# Patient Record
Sex: FEMALE | Race: WHITE | NOT HISPANIC OR LATINO | Employment: OTHER | URBAN - METROPOLITAN AREA
[De-identification: names, ages, dates, MRNs, and addresses within clinical notes are randomized per-mention and may not be internally consistent; named-entity substitution may affect disease eponyms.]

---

## 2023-12-14 ENCOUNTER — TELEPHONE (OUTPATIENT)
Dept: PRIMARY CARE | Facility: CLINIC | Age: 78
End: 2023-12-14
Payer: MEDICARE

## 2023-12-14 DIAGNOSIS — E78.2 MIXED HYPERLIPIDEMIA: ICD-10-CM

## 2023-12-14 DIAGNOSIS — D72.819 LEUKOPENIA, UNSPECIFIED TYPE: ICD-10-CM

## 2023-12-14 DIAGNOSIS — E03.9 HYPOTHYROIDISM, UNSPECIFIED TYPE: ICD-10-CM

## 2023-12-18 PROBLEM — E78.2 MIXED HYPERLIPIDEMIA: Status: ACTIVE | Noted: 2018-10-02

## 2023-12-18 PROBLEM — E03.9 HYPOTHYROIDISM, UNSPECIFIED: Status: ACTIVE | Noted: 2018-10-02

## 2023-12-18 PROBLEM — D72.819 DECREASED WHITE BLOOD CELL COUNT, UNSPECIFIED: Status: ACTIVE | Noted: 2018-10-02

## 2024-01-10 ENCOUNTER — LAB (OUTPATIENT)
Dept: LAB | Facility: LAB | Age: 79
End: 2024-01-10
Payer: MEDICARE

## 2024-01-10 DIAGNOSIS — E03.9 HYPOTHYROIDISM, UNSPECIFIED TYPE: ICD-10-CM

## 2024-01-10 DIAGNOSIS — D72.819 LEUKOPENIA, UNSPECIFIED TYPE: ICD-10-CM

## 2024-01-10 DIAGNOSIS — E78.2 MIXED HYPERLIPIDEMIA: ICD-10-CM

## 2024-01-10 LAB
ALBUMIN SERPL BCP-MCNC: 4.2 G/DL (ref 3.4–5)
ALP SERPL-CCNC: 66 U/L (ref 33–136)
ALT SERPL W P-5'-P-CCNC: 12 U/L (ref 7–45)
ANION GAP SERPL CALC-SCNC: 12 MMOL/L (ref 10–20)
APPEARANCE UR: CLEAR
AST SERPL W P-5'-P-CCNC: 19 U/L (ref 9–39)
BASOPHILS # BLD AUTO: 0.04 X10*3/UL (ref 0–0.1)
BASOPHILS NFR BLD AUTO: 1.1 %
BILIRUB SERPL-MCNC: 0.6 MG/DL (ref 0–1.2)
BILIRUB UR STRIP.AUTO-MCNC: NEGATIVE MG/DL
BUN SERPL-MCNC: 17 MG/DL (ref 6–23)
CALCIUM SERPL-MCNC: 9.4 MG/DL (ref 8.6–10.3)
CHLORIDE SERPL-SCNC: 99 MMOL/L (ref 98–107)
CHOLEST SERPL-MCNC: 215 MG/DL (ref 0–199)
CHOLESTEROL/HDL RATIO: 2.9
CO2 SERPL-SCNC: 27 MMOL/L (ref 21–32)
COLOR UR: NORMAL
CREAT SERPL-MCNC: 1.05 MG/DL (ref 0.5–1.05)
EGFRCR SERPLBLD CKD-EPI 2021: 54 ML/MIN/1.73M*2
EOSINOPHIL # BLD AUTO: 0.07 X10*3/UL (ref 0–0.4)
EOSINOPHIL NFR BLD AUTO: 2 %
ERYTHROCYTE [DISTWIDTH] IN BLOOD BY AUTOMATED COUNT: 12 % (ref 11.5–14.5)
GLUCOSE SERPL-MCNC: 78 MG/DL (ref 74–99)
GLUCOSE UR STRIP.AUTO-MCNC: NEGATIVE MG/DL
HCT VFR BLD AUTO: 40.4 % (ref 36–46)
HDLC SERPL-MCNC: 73.1 MG/DL
HGB BLD-MCNC: 13.3 G/DL (ref 12–16)
IMM GRANULOCYTES # BLD AUTO: 0.01 X10*3/UL (ref 0–0.5)
IMM GRANULOCYTES NFR BLD AUTO: 0.3 % (ref 0–0.9)
KETONES UR STRIP.AUTO-MCNC: NEGATIVE MG/DL
LDLC SERPL CALC-MCNC: 125 MG/DL
LEUKOCYTE ESTERASE UR QL STRIP.AUTO: NEGATIVE
LYMPHOCYTES # BLD AUTO: 1.3 X10*3/UL (ref 0.8–3)
LYMPHOCYTES NFR BLD AUTO: 36.9 %
MCH RBC QN AUTO: 29.8 PG (ref 26–34)
MCHC RBC AUTO-ENTMCNC: 32.9 G/DL (ref 32–36)
MCV RBC AUTO: 90 FL (ref 80–100)
MONOCYTES # BLD AUTO: 0.28 X10*3/UL (ref 0.05–0.8)
MONOCYTES NFR BLD AUTO: 8 %
NEUTROPHILS # BLD AUTO: 1.82 X10*3/UL (ref 1.6–5.5)
NEUTROPHILS NFR BLD AUTO: 51.7 %
NITRITE UR QL STRIP.AUTO: NEGATIVE
NON HDL CHOLESTEROL: 142 MG/DL (ref 0–149)
NRBC BLD-RTO: 0 /100 WBCS (ref 0–0)
PH UR STRIP.AUTO: 6 [PH]
PLATELET # BLD AUTO: 223 X10*3/UL (ref 150–450)
POTASSIUM SERPL-SCNC: 4.7 MMOL/L (ref 3.5–5.3)
PROT SERPL-MCNC: 6.6 G/DL (ref 6.4–8.2)
PROT UR STRIP.AUTO-MCNC: NEGATIVE MG/DL
RBC # BLD AUTO: 4.47 X10*6/UL (ref 4–5.2)
RBC # UR STRIP.AUTO: NEGATIVE /UL
SODIUM SERPL-SCNC: 133 MMOL/L (ref 136–145)
SP GR UR STRIP.AUTO: 1.01
TRIGL SERPL-MCNC: 84 MG/DL (ref 0–149)
TSH SERPL-ACNC: 1.48 MIU/L (ref 0.44–3.98)
UROBILINOGEN UR STRIP.AUTO-MCNC: <2 MG/DL
VLDL: 17 MG/DL (ref 0–40)
WBC # BLD AUTO: 3.5 X10*3/UL (ref 4.4–11.3)

## 2024-01-10 PROCEDURE — 84443 ASSAY THYROID STIM HORMONE: CPT

## 2024-01-10 PROCEDURE — 80053 COMPREHEN METABOLIC PANEL: CPT

## 2024-01-10 PROCEDURE — 36415 COLL VENOUS BLD VENIPUNCTURE: CPT

## 2024-01-10 PROCEDURE — 80061 LIPID PANEL: CPT

## 2024-01-10 PROCEDURE — 85025 COMPLETE CBC W/AUTO DIFF WBC: CPT

## 2024-01-10 PROCEDURE — 81003 URINALYSIS AUTO W/O SCOPE: CPT

## 2024-01-17 ENCOUNTER — OFFICE VISIT (OUTPATIENT)
Dept: PRIMARY CARE | Facility: CLINIC | Age: 79
End: 2024-01-17
Payer: MEDICARE

## 2024-01-17 VITALS
BODY MASS INDEX: 19.62 KG/M2 | WEIGHT: 125 LBS | DIASTOLIC BLOOD PRESSURE: 62 MMHG | SYSTOLIC BLOOD PRESSURE: 110 MMHG | HEIGHT: 67 IN | HEART RATE: 62 BPM | OXYGEN SATURATION: 98 % | RESPIRATION RATE: 18 BRPM

## 2024-01-17 DIAGNOSIS — E78.5 HYPERLIPIDEMIA, UNSPECIFIED HYPERLIPIDEMIA TYPE: ICD-10-CM

## 2024-01-17 DIAGNOSIS — E03.9 HYPOTHYROIDISM, UNSPECIFIED TYPE: Primary | ICD-10-CM

## 2024-01-17 DIAGNOSIS — F34.1 DYSTHYMIA: ICD-10-CM

## 2024-01-17 DIAGNOSIS — Z00.00 WELL ADULT EXAM: ICD-10-CM

## 2024-01-17 PROCEDURE — 1036F TOBACCO NON-USER: CPT | Performed by: FAMILY MEDICINE

## 2024-01-17 PROCEDURE — 1125F AMNT PAIN NOTED PAIN PRSNT: CPT | Performed by: FAMILY MEDICINE

## 2024-01-17 PROCEDURE — 1159F MED LIST DOCD IN RCRD: CPT | Performed by: FAMILY MEDICINE

## 2024-01-17 PROCEDURE — G0439 PPPS, SUBSEQ VISIT: HCPCS | Performed by: FAMILY MEDICINE

## 2024-01-17 PROCEDURE — 1170F FXNL STATUS ASSESSED: CPT | Performed by: FAMILY MEDICINE

## 2024-01-17 RX ORDER — FLUVASTATIN 20 MG/1
CAPSULE ORAL
COMMUNITY
Start: 2021-08-14 | End: 2024-02-28

## 2024-01-17 RX ORDER — LEVOTHYROXINE SODIUM 50 UG/1
50 TABLET ORAL
COMMUNITY
Start: 2021-09-05

## 2024-01-17 RX ORDER — CETIRIZINE HYDROCHLORIDE 10 MG/1
10 TABLET ORAL
COMMUNITY
Start: 2018-07-26 | End: 2024-04-24 | Stop reason: WASHOUT

## 2024-01-17 ASSESSMENT — ACTIVITIES OF DAILY LIVING (ADL)
BATHING: INDEPENDENT
FEEDING YOURSELF: INDEPENDENT
ADEQUATE_TO_COMPLETE_ADL: YES
HEARING - RIGHT EAR: FUNCTIONAL
JUDGMENT_ADEQUATE_SAFELY_COMPLETE_DAILY_ACTIVITIES: YES
TOILETING: INDEPENDENT
GROOMING: INDEPENDENT
HEARING - LEFT EAR: FUNCTIONAL
WALKS IN HOME: INDEPENDENT
PATIENT'S MEMORY ADEQUATE TO SAFELY COMPLETE DAILY ACTIVITIES?: YES
DRESSING YOURSELF: INDEPENDENT

## 2024-01-17 ASSESSMENT — PROMIS GLOBAL HEALTH SCALE
RATE_SOCIAL_SATISFACTION: GOOD
RATE_MENTAL_HEALTH: GOOD
RATE_AVERAGE_FATIGUE: MODERATE
CARRYOUT_PHYSICAL_ACTIVITIES: MODERATELY
RATE_GENERAL_HEALTH: GOOD
EMOTIONAL_PROBLEMS: RARELY
RATE_AVERAGE_PAIN: 4
RATE_QUALITY_OF_LIFE: GOOD
CARRYOUT_SOCIAL_ACTIVITIES: GOOD
RATE_PHYSICAL_HEALTH: GOOD

## 2024-01-17 ASSESSMENT — PATIENT HEALTH QUESTIONNAIRE - PHQ9
2. FEELING DOWN, DEPRESSED OR HOPELESS: NOT AT ALL
1. LITTLE INTEREST OR PLEASURE IN DOING THINGS: NOT AT ALL
SUM OF ALL RESPONSES TO PHQ9 QUESTIONS 1 AND 2: 0

## 2024-01-17 ASSESSMENT — ENCOUNTER SYMPTOMS
OCCASIONAL FEELINGS OF UNSTEADINESS: 0
LOSS OF SENSATION IN FEET: 0
DEPRESSION: 0

## 2024-01-17 ASSESSMENT — PAIN SCALES - GENERAL: PAINLEVEL: 4

## 2024-01-17 NOTE — PROGRESS NOTES
Subjective   Patient ID: Elisha Canas is a 78 y.o. female who presents for Medicare Annual Wellness Visit Subsequent.    HPI Elisha is seen for follow-up for a comprehensive physical exam. PMH, PSH PSH are reviewed and updated. . States she is doing well  GYN history She is still following up with Dr. Medeiros.  Elisha is seen today follow up depressed mood. She has struggled with dysthymia/ low level depression for several years which started largely in conjunction with her 's cognitive decline and Alzheimer's. Notes very good control of her symptoms.  She is weaning off her sertraline.  Elisha is seen follow up Hypothyroidism. Has been stable on 50mcg levothyroxine for many years, with TSH in normal ranges.  Elisha is seen today for a chronic mild mixed lipid disorder. Previously statin intolerance due to side effects, however has been tolerating fluvastatin 20mg well with a much improved lipid profile. Negative nuclear stress in April 2017.  Elisha is seen today follow up mild leukopenia. . All other blood counts have been normal. Now back to normal.  Patient has a history of seasonal allergies with postnasal drip . She did not see a significant improvement using an antihistamine. However she has been on Singulair for more than a year and is much improved.  Patient had a tetanus shot done in 2023. She had shingles immunizations in 2021. She has had both of her pneumococcal immunizations. She has been immunized against coronavirus x5. She has had influenza immunization in 2023.   Her most recent colonoscopy was done 2018.   Patient has never used tobacco.   Patient drinks less than 1 drink per week.    Review of Systems  Constitutional Symptoms: She is negative for fever, chills, night sweats, fatigue, Recent Illness.   Eyes: She is negative for loss and blurring of vision, double vision.   Ear, Nose, Mouth, Throat: She is negative for hearing loss, allergies, nasal congestion, teeth problems, mouth sores,  "dysphagia, sore throat.   Cardiovascular: She is negative for chest pain/pressure, palpitations, edema, claudication.   Respiratory: She is negative for shortness of breath, dyspnea on exertion, coughing.   Breast: She is negative for tenderness, masses, nipple discharge, skin changes.   Gastrointestinal: She is negative for indigestion, nausea, vomiting, abdominal pain, diarrhea, constipation, hematochezia, melena, blood in stool.   Female Genitourinary: She is negative for stress incontinence, urge incontinence, frequency, hot flashes, abnormal menses, pelvic pain, vaginal discharge.   Musculoskeletal: She is positive for right 5th metatarsal fracture. She is negative for joint pain, myalgias.   Integumentary: She is negative for change in mole, skin trouble or rash, loss of hair.   Neurological: She is negative for headache, numbness, tingling, weakness, balance problems, memory loss.   Psychiatric: She is positive for depressed mood. She is negative for anxiety.   Endocrine: She is negative for weight gain, polyuria, polydipsia, polyphagia.   Hematologic/Lymphatic: She is negative for bruising, abnormal bleeding, swollen glands.  Objective   /62   Pulse 62   Resp 18   Ht 1.702 m (5' 7\")   Wt 56.7 kg (125 lb)   SpO2 98%   BMI 19.58 kg/m²     Physical Exam  General: Vitals reviewed , Elisha sits comfortably in exam room table . She is a thin healthy-appearing elderly  female . She is awake alert oriented. She is very pleasant.   HEENT : Head is normocephalic atraumatic. scalp is normal to inspection , the hair is well groomed and short cropped . No alopecia noted.  Ears: Normal externally , canals clear , TMs pearly gray.   Eyes: Conjunctiva and sclera clear , pupils equal and reactive , EOMI.   Oropharynx : not examined due to mask .   Neck: Normal to inspection , soft and supple , no palpable adenopathy thyromegaly or clavicular adenopathy .   chest: Moderate reproducible tenderness along the " right anterior/ lateral chest wall just beneath the breast line , and to the mid axillary line as well as posteriorly near the rhomboid musculature . There is no crepitus , chest wall deformity , or pinpoint tenderness.  Pulmonary : Clear vesicular breath sounds in all fields throughout the posterior without wheezing rales or rhonchi .   Cardiovascular : Regular rate and rhythm, no notable murmurs rubs or gallops . Carotids without bruit , DP pulses are 2+, no edema , no clubbing noted . Abdomen : Flat , soft , no tenderness organomegaly or palpable masses throughout .   Musculoskeletal: Mild arthritic changes of the hands, no inflammatory changes, good strength and range of motion in the upper and lower extremities . Full range of motion of the right upper extremity without limitation.   Neurologic: Intact and nonfocal, cranial nerves 2-12 grossly intact , patellar reflexes 1+ bilaterally .   Integumentary : Well tanned , good turgor , Scattered nevi throughout the posterior thorax , none are notably suspicious, there is no bruising rashes or eruptions throughout. the nails are in good repair.   Assessment/Plan   Problem List Items Addressed This Visit             ICD-10-CM    Hypothyroidism, unspecified - Primary  Stable.  Continue on current medications. E03.9     Other Visit Diagnoses         Codes    Dysthymia    Improving. F34.1    Hyperlipidemia, unspecified hyperlipidemia type    Stable. E78.5    Well adult exam    Normal exam. Z00.00

## 2024-02-28 DIAGNOSIS — E78.2 MIXED HYPERLIPIDEMIA: ICD-10-CM

## 2024-02-28 RX ORDER — FLUVASTATIN 20 MG/1
20 CAPSULE ORAL NIGHTLY
Qty: 90 CAPSULE | Refills: 3 | Status: SHIPPED | OUTPATIENT
Start: 2024-02-28

## 2024-04-24 ENCOUNTER — OFFICE VISIT (OUTPATIENT)
Dept: PRIMARY CARE | Facility: CLINIC | Age: 79
End: 2024-04-24
Payer: MEDICARE

## 2024-04-24 VITALS
DIASTOLIC BLOOD PRESSURE: 70 MMHG | BODY MASS INDEX: 20.09 KG/M2 | HEART RATE: 85 BPM | HEIGHT: 67 IN | WEIGHT: 128 LBS | SYSTOLIC BLOOD PRESSURE: 104 MMHG | OXYGEN SATURATION: 96 %

## 2024-04-24 DIAGNOSIS — R25.1 TREMOR: Primary | ICD-10-CM

## 2024-04-24 PROCEDURE — 1036F TOBACCO NON-USER: CPT | Performed by: FAMILY MEDICINE

## 2024-04-24 PROCEDURE — 99213 OFFICE O/P EST LOW 20 MIN: CPT | Performed by: FAMILY MEDICINE

## 2024-04-24 PROCEDURE — 1159F MED LIST DOCD IN RCRD: CPT | Performed by: FAMILY MEDICINE

## 2024-04-24 PROCEDURE — 1126F AMNT PAIN NOTED NONE PRSNT: CPT | Performed by: FAMILY MEDICINE

## 2024-04-24 ASSESSMENT — PATIENT HEALTH QUESTIONNAIRE - PHQ9
2. FEELING DOWN, DEPRESSED OR HOPELESS: NOT AT ALL
SUM OF ALL RESPONSES TO PHQ9 QUESTIONS 1 AND 2: 0
1. LITTLE INTEREST OR PLEASURE IN DOING THINGS: NOT AT ALL

## 2024-04-24 ASSESSMENT — PAIN SCALES - GENERAL: PAINLEVEL: 0-NO PAIN

## 2024-04-24 NOTE — PROGRESS NOTES
"Subjective   Patient ID: Elisha Canas is a 79 y.o. female who presents for Foot Problem (Tremor right foot and right hand.   Foot tremor persisting.).    HPI Here for new onset tremor.  She has episodes of tremor in her right hand.  It comes/goes days at at a time.  It affects her use of eating, drinking, writing.  This has been occurring for a few months.  Now has developed a tremor in her right foot that makes driving a car difficult.  It occurs intermittently.  No family Hx of tremor.    Review of Systems  Constitutional:  Pt is negative for fever, fatigue, weight loss.  HEENT:  Pt is negative for change in vision, hearing, swallow.  Cardio:  Pt is negative for CP, LE edema.  Pulm:  Pt is negative for cough, SOB  Neuro: Pt is  positive for tremor in right hand and right foot, intermittent.  Objective   /70   Pulse 85   Ht 1.702 m (5' 7\")   Wt 58.1 kg (128 lb)   SpO2 96%   BMI 20.05 kg/m²     Physical Exam  General:  awake, alert, in NAD.  HEENT:   moist oral mucosa, no cervical lymphadenopathy  Cardio:  S1+S2 no M/R/G  Neuro:  Active high frequency tremor in foot.  Assessment/Plan   Problem List Items Addressed This Visit    None  Visit Diagnoses         Codes    Tremor    -  Primary  Needs referral to Neurology. R25.1    Relevant Orders    Referral to Neurology               "

## 2024-07-04 DIAGNOSIS — E03.9 HYPOTHYROIDISM, UNSPECIFIED TYPE: ICD-10-CM

## 2024-07-05 RX ORDER — LEVOTHYROXINE SODIUM 50 UG/1
50 TABLET ORAL DAILY
Qty: 90 TABLET | Refills: 3 | Status: SHIPPED | OUTPATIENT
Start: 2024-07-05

## 2024-09-04 ENCOUNTER — HOSPITAL ENCOUNTER (OUTPATIENT)
Dept: RADIOLOGY | Facility: HOSPITAL | Age: 79
Discharge: HOME | End: 2024-09-04
Payer: MEDICARE

## 2024-09-04 VITALS — HEIGHT: 68 IN | WEIGHT: 130 LBS | BODY MASS INDEX: 19.7 KG/M2

## 2024-09-04 DIAGNOSIS — Z12.31 ENCOUNTER FOR SCREENING MAMMOGRAM FOR MALIGNANT NEOPLASM OF BREAST: ICD-10-CM

## 2024-09-04 PROCEDURE — 77067 SCR MAMMO BI INCL CAD: CPT

## 2024-09-04 PROCEDURE — 77063 BREAST TOMOSYNTHESIS BI: CPT | Performed by: RADIOLOGY

## 2024-09-04 PROCEDURE — 77067 SCR MAMMO BI INCL CAD: CPT | Performed by: RADIOLOGY

## 2024-12-09 ENCOUNTER — TELEPHONE (OUTPATIENT)
Dept: PRIMARY CARE | Facility: CLINIC | Age: 79
End: 2024-12-09
Payer: MEDICARE

## 2024-12-09 NOTE — TELEPHONE ENCOUNTER
Rx Refill Request Telephone Encounter    Name:  Elisha Canas  :  [id number]  Medication Name:  Sertraline 100 mg 90 day, told her MJM is out             Specific Pharmacy location:  [facility] [phone]  Date of last appointment:    Date of next appointment:    Best number to reach patient:

## 2024-12-16 DIAGNOSIS — F34.1 DYSTHYMIA: Primary | ICD-10-CM

## 2024-12-16 RX ORDER — SERTRALINE HYDROCHLORIDE 100 MG/1
100 TABLET, FILM COATED ORAL DAILY
Qty: 90 TABLET | Refills: 3 | Status: SHIPPED | OUTPATIENT
Start: 2024-12-16 | End: 2025-12-16

## 2025-01-13 ENCOUNTER — TELEPHONE (OUTPATIENT)
Dept: PRIMARY CARE | Facility: CLINIC | Age: 80
End: 2025-01-13
Payer: MEDICARE

## 2025-01-13 DIAGNOSIS — E03.9 HYPOTHYROIDISM, UNSPECIFIED TYPE: ICD-10-CM

## 2025-01-13 DIAGNOSIS — E78.5 HYPERLIPIDEMIA, UNSPECIFIED HYPERLIPIDEMIA TYPE: ICD-10-CM

## 2025-01-31 ENCOUNTER — APPOINTMENT (OUTPATIENT)
Dept: PRIMARY CARE | Facility: CLINIC | Age: 80
End: 2025-01-31
Payer: MEDICARE

## 2025-01-31 VITALS
WEIGHT: 130 LBS | HEART RATE: 86 BPM | SYSTOLIC BLOOD PRESSURE: 132 MMHG | DIASTOLIC BLOOD PRESSURE: 84 MMHG | BODY MASS INDEX: 20.06 KG/M2 | OXYGEN SATURATION: 96 %

## 2025-01-31 DIAGNOSIS — D72.819 LEUKOPENIA, UNSPECIFIED TYPE: ICD-10-CM

## 2025-01-31 DIAGNOSIS — R42 LIGHTHEADEDNESS: ICD-10-CM

## 2025-01-31 DIAGNOSIS — S09.90XD TRAUMATIC INJURY OF HEAD, SUBSEQUENT ENCOUNTER: Primary | ICD-10-CM

## 2025-01-31 DIAGNOSIS — E03.9 HYPOTHYROIDISM, UNSPECIFIED TYPE: ICD-10-CM

## 2025-01-31 DIAGNOSIS — E78.5 HYPERLIPIDEMIA, UNSPECIFIED HYPERLIPIDEMIA TYPE: ICD-10-CM

## 2025-01-31 DIAGNOSIS — Z00.00 WELL ADULT EXAM: ICD-10-CM

## 2025-01-31 ASSESSMENT — PATIENT HEALTH QUESTIONNAIRE - PHQ9
SUM OF ALL RESPONSES TO PHQ9 QUESTIONS 1 AND 2: 0
1. LITTLE INTEREST OR PLEASURE IN DOING THINGS: NOT AT ALL
2. FEELING DOWN, DEPRESSED OR HOPELESS: NOT AT ALL

## 2025-01-31 ASSESSMENT — PAIN SCALES - GENERAL: PAINLEVEL_OUTOF10: 0-NO PAIN

## 2025-01-31 NOTE — PROGRESS NOTES
Subjective   Patient ID: Elisha Canas is a 79 y.o. female who presents for Dizziness (X couple months. She hit her head on a door jam on 12/29 ).    HPI here with multiple issues today.  Over the past 6 months patient has noticed that she gets a feeling of lightheadedness when she stands from a seated position.  About a month ago during 1 of these events patient stood up and hit  the right side of her head against a door jam.  She developed significant contusion with black and blue and a large welt.  That area has only recently resolved.  A few days after she banged her head she was awakened 1 night with a loud clapping sound in her right ear.  She does not ever get room spinning dizziness and she does not get tunnel vision.  Of note, she remembers having had vertigo years ago with room spinning dizziness and nausea which she contrast with her current lightheadedness.  Patient has a tremor.  She has been seen by neurology for this.  Initially she was tried on primidone but patient did not to take the medicine because the medication can cause or worsen dementia.  Both of her siblings and her  have or had dementia.  She was then tried on a beta-blocker, atenolol at 80 mg and found to be very woozy when taking this medication.  She noticed that her heart rate dropped into the low 60s.  She discontinued the medication she was then given atenolol 20 mg daily.  This medication continue to worsen her lightheadedness and she discontinued its use.  She has not had further follow-up with her neurologist.      Review of Systems  Constitutional: Patient is negative for fever, fatigue, weight change.  HEENT: Patient is negative for change in vision, hearing, swallow.  Cardio: Patient is negative for chest pain, lower extremity edema.  Pulmonary: Patient is negative for cough, shortness of breath.    Objective   /84 (BP Location: Left arm)   Pulse 86   Wt 59 kg (130 lb)   SpO2 96%   BMI 20.06 kg/m²      Physical Exam  General: Awake and alert no apparent distress.  HEENT: Moist oral mucosa no cervical lymphadenopathy.  TMs with good color and light reflex.  Patient with resolving ecchymosis to the right zygoma  Cardio: Heart S1-S2 no murmur rub or gallop.  Pulmonary: Lungs clear to auscultation bilaterally.  Assessment/Plan   Problem List Items Addressed This Visit             ICD-10-CM    Decreased white blood cell count, unspecified needs workup.  Patient has a physical exam scheduled for March, 2025.  She will get that lab work done now. D72.819    Relevant Orders    CBC and Auto Differential    Hypothyroidism, unspecified needs workup.  Patient to get TSH in anticipation of her physical in March, 2025. E03.9    Relevant Orders    Tsh With Reflex To Free T4 If Abnormal     Other Visit Diagnoses         Codes    Traumatic injury of head, subsequent encounter    -  Primary needs workup.  Patient have CT head without contrast S09.90XD    Relevant Orders    CT head wo IV contrast    Hyperlipidemia, unspecified hyperlipidemia type    needs workup.  Patient have CMP and a lipid panel done in the near future in anticipation of March, 2025 physical exam. E78.5    Relevant Orders    Comprehensive metabolic panel    Lipid panel    Well adult exam     Z00.00    Relevant Orders    Urinalysis with Reflex Microscopic    Lightheadedness    needs better control.  Suspect orthostatic hypotension.  Will get CBC CMP.  May consider cardiology referral R42

## 2025-02-05 ENCOUNTER — HOSPITAL ENCOUNTER (OUTPATIENT)
Dept: RADIOLOGY | Facility: HOSPITAL | Age: 80
Discharge: HOME | End: 2025-02-05
Payer: MEDICARE

## 2025-02-05 DIAGNOSIS — S09.90XD TRAUMATIC INJURY OF HEAD, SUBSEQUENT ENCOUNTER: ICD-10-CM

## 2025-02-05 LAB
ALBUMIN SERPL-MCNC: 4.6 G/DL (ref 3.6–5.1)
ALP SERPL-CCNC: 76 U/L (ref 37–153)
ALT SERPL-CCNC: 15 U/L (ref 6–29)
ANION GAP SERPL CALCULATED.4IONS-SCNC: 10 MMOL/L (CALC) (ref 7–17)
APPEARANCE UR: CLEAR
AST SERPL-CCNC: 21 U/L (ref 10–35)
BASOPHILS # BLD AUTO: 60 CELLS/UL (ref 0–200)
BASOPHILS NFR BLD AUTO: 1.5 %
BILIRUB SERPL-MCNC: 0.6 MG/DL (ref 0.2–1.2)
BILIRUB UR QL STRIP: NEGATIVE
BUN SERPL-MCNC: 14 MG/DL (ref 7–25)
CALCIUM SERPL-MCNC: 9.5 MG/DL (ref 8.6–10.4)
CHLORIDE SERPL-SCNC: 98 MMOL/L (ref 98–110)
CHOLEST SERPL-MCNC: 229 MG/DL
CHOLEST/HDLC SERPL: 2.5 (CALC)
CO2 SERPL-SCNC: 27 MMOL/L (ref 20–32)
COLOR UR: YELLOW
CREAT SERPL-MCNC: 1.04 MG/DL (ref 0.6–0.95)
EGFRCR SERPLBLD CKD-EPI 2021: 54 ML/MIN/1.73M2
EOSINOPHIL # BLD AUTO: 80 CELLS/UL (ref 15–500)
EOSINOPHIL NFR BLD AUTO: 2 %
ERYTHROCYTE [DISTWIDTH] IN BLOOD BY AUTOMATED COUNT: 11.9 % (ref 11–15)
GLUCOSE SERPL-MCNC: 72 MG/DL (ref 65–99)
GLUCOSE UR QL STRIP: NEGATIVE
HCT VFR BLD AUTO: 40.5 % (ref 35–45)
HDLC SERPL-MCNC: 90 MG/DL
HGB BLD-MCNC: 13.2 G/DL (ref 11.7–15.5)
HGB UR QL STRIP: NEGATIVE
KETONES UR QL STRIP: NEGATIVE
LDLC SERPL CALC-MCNC: 123 MG/DL (CALC)
LEUKOCYTE ESTERASE UR QL STRIP: NEGATIVE
LYMPHOCYTES # BLD AUTO: 1248 CELLS/UL (ref 850–3900)
LYMPHOCYTES NFR BLD AUTO: 31.2 %
MCH RBC QN AUTO: 29.7 PG (ref 27–33)
MCHC RBC AUTO-ENTMCNC: 32.6 G/DL (ref 32–36)
MCV RBC AUTO: 91.2 FL (ref 80–100)
MONOCYTES # BLD AUTO: 344 CELLS/UL (ref 200–950)
MONOCYTES NFR BLD AUTO: 8.6 %
NEUTROPHILS # BLD AUTO: 2268 CELLS/UL (ref 1500–7800)
NEUTROPHILS NFR BLD AUTO: 56.7 %
NITRITE UR QL STRIP: NEGATIVE
NONHDLC SERPL-MCNC: 139 MG/DL (CALC)
PH UR STRIP: 7 [PH] (ref 5–8)
PLATELET # BLD AUTO: 231 THOUSAND/UL (ref 140–400)
PMV BLD REES-ECKER: 9.9 FL (ref 7.5–12.5)
POTASSIUM SERPL-SCNC: 4.2 MMOL/L (ref 3.5–5.3)
PROT SERPL-MCNC: 7 G/DL (ref 6.1–8.1)
PROT UR QL STRIP: NEGATIVE
RBC # BLD AUTO: 4.44 MILLION/UL (ref 3.8–5.1)
SODIUM SERPL-SCNC: 135 MMOL/L (ref 135–146)
SP GR UR STRIP: 1 (ref 1–1.03)
TRIGL SERPL-MCNC: 72 MG/DL
TSH SERPL-ACNC: 1.23 MIU/L (ref 0.4–4.5)
WBC # BLD AUTO: 4 THOUSAND/UL (ref 3.8–10.8)

## 2025-02-05 PROCEDURE — 70450 CT HEAD/BRAIN W/O DYE: CPT

## 2025-02-10 ENCOUNTER — APPOINTMENT (OUTPATIENT)
Dept: RADIOLOGY | Facility: HOSPITAL | Age: 80
End: 2025-02-10
Payer: MEDICARE

## 2025-02-14 DIAGNOSIS — E78.2 MIXED HYPERLIPIDEMIA: ICD-10-CM

## 2025-02-18 RX ORDER — FLUVASTATIN 20 MG/1
20 CAPSULE ORAL NIGHTLY
Qty: 90 CAPSULE | Refills: 3 | Status: SHIPPED | OUTPATIENT
Start: 2025-02-18

## 2025-03-07 ENCOUNTER — APPOINTMENT (OUTPATIENT)
Dept: PRIMARY CARE | Facility: CLINIC | Age: 80
End: 2025-03-07
Payer: MEDICARE

## 2025-03-07 VITALS
TEMPERATURE: 97.5 F | WEIGHT: 131 LBS | HEIGHT: 68 IN | HEART RATE: 76 BPM | SYSTOLIC BLOOD PRESSURE: 110 MMHG | OXYGEN SATURATION: 97 % | DIASTOLIC BLOOD PRESSURE: 70 MMHG | BODY MASS INDEX: 19.85 KG/M2

## 2025-03-07 DIAGNOSIS — I83.91 VARICOSE VEINS OF RIGHT LOWER EXTREMITY, UNSPECIFIED WHETHER COMPLICATED: Primary | ICD-10-CM

## 2025-03-07 DIAGNOSIS — F34.1 DYSTHYMIA: ICD-10-CM

## 2025-03-07 DIAGNOSIS — Z78.0 POSTMENOPAUSAL: ICD-10-CM

## 2025-03-07 DIAGNOSIS — R25.1 TREMOR: ICD-10-CM

## 2025-03-07 DIAGNOSIS — E78.5 HYPERLIPIDEMIA, UNSPECIFIED HYPERLIPIDEMIA TYPE: ICD-10-CM

## 2025-03-07 DIAGNOSIS — E03.9 HYPOTHYROIDISM, UNSPECIFIED TYPE: ICD-10-CM

## 2025-03-07 DIAGNOSIS — Z00.00 WELL ADULT EXAM: ICD-10-CM

## 2025-03-07 ASSESSMENT — ACTIVITIES OF DAILY LIVING (ADL)
DRESSING: INDEPENDENT
MANAGING_FINANCES: INDEPENDENT
GROCERY_SHOPPING: INDEPENDENT
DOING_HOUSEWORK: INDEPENDENT
TAKING_MEDICATION: INDEPENDENT
BATHING: INDEPENDENT

## 2025-03-07 ASSESSMENT — PAIN SCALES - GENERAL: PAINLEVEL_OUTOF10: 0-NO PAIN

## 2025-03-07 ASSESSMENT — ENCOUNTER SYMPTOMS
DEPRESSION: 0
LOSS OF SENSATION IN FEET: 0
OCCASIONAL FEELINGS OF UNSTEADINESS: 0

## 2025-03-07 NOTE — PROGRESS NOTES
Fermin fonsecaay to fax the results if they were thought to spike, show you so Subjective   Patient ID: Elisha Canas is a 80 y.o. female who was seen here on Monday half day thank you due to stopping presents for Medicare Annual Wellness Visit Subsequent (EKG done 1/2023 due today/Mammogram 8/2024 done thru GYN/Bone Density 11/2019 normal/Labs and U/A done 2/4/25/Flu vaccine  9/2024/Pneumonia vaccine x 2 up to date/Zoster vaccine x 2  Up to date).    HPI   Elisha is seen for follow-up for a comprehensive physical exam. PMH, PSH PSH are reviewed and updated. . States she is doing well  GYN history She is still following up with Dr. Medeiros.  Elisha is seen today follow up depressed mood. She has struggled with dysthymia/ low level depression for several years which started largely in conjunction with her 's cognitive decline and Alzheimer's and eventual death.  On sertraline.  Elisha is seen follow up Hypothyroidism. Has been stable on 50 mcg levothyroxine for many years, with TSH in normal ranges.  Elisha is seen today for a chronic mild mixed lipid disorder. Previously statin intolerance due to side effects, however has been tolerating fluvastatin 20mg well with a much improved lipid profile. Negative nuclear stress in April 2017.  Elisha is seen today follow up mild leukopenia. . All other blood counts have been normal. Now back to normal.  Patient has a history of seasonal allergies with postnasal drip . She did not see a significant improvement using an antihistamine. However she has been on Singulair for more than a year and is much improved.  Patient had a tetanus shot done in 2023. She had shingles immunizations in 2021. She has had both of her pneumococcal immunizations. She has been immunized against coronavirus x7. She has had influenza immunization in 2024.   Her most recent colonoscopy was done 2018 and does not need further testing..   Patient has never used tobacco.   Patient drinks less than 1 drink  "per week.     Review of Systems  Constitutional Symptoms: She is negative for fever, chills, night sweats, fatigue, Recent Illness.   Eyes: She is negative for loss and blurring of vision, double vision.   Ear, Nose, Mouth, Throat: She is negative for hearing loss, allergies, nasal congestion, teeth problems, mouth sores, dysphagia, sore throat.   Cardiovascular: She is negative for chest pain/pressure, palpitations, edema, claudication.   Respiratory: She is negative for shortness of breath, dyspnea on exertion, coughing.   Breast: She is negative for tenderness, masses, nipple discharge, skin changes.   Gastrointestinal: She is negative for indigestion, nausea, vomiting, abdominal pain, diarrhea, constipation, hematochezia, melena, blood in stool.   Female Genitourinary: She is negative for stress incontinence, urge incontinence, frequency, hot flashes, abnormal menses, pelvic pain, vaginal discharge.   Musculoskeletal: She is positive for right 5th metatarsal fracture. She is negative for joint pain, myalgias.   Integumentary: She is negative for change in mole, skin trouble or rash, loss of hair.   Neurological: She is negative for headache, numbness, tingling, weakness, balance problems, memory loss.   Psychiatric: She is positive for depressed mood. She is negative for anxiety.   Endocrine: She is negative for weight gain, polyuria, polydipsia, polyphagia.   Hematologic/Lymphatic: She is negative for bruising, abnormal bleeding, swollen glands.   Objective   /70 (BP Location: Left arm, Patient Position: Sitting, BP Cuff Size: Adult)   Pulse 76   Temp 36.4 °C (97.5 °F) (Temporal)   Ht 1.715 m (5' 7.5\")   Wt 59.4 kg (131 lb)   SpO2 97%   BMI 20.21 kg/m²     Physical Exam  General: Vitals reviewed , Elisha sits comfortably in exam room table . She is a thin healthy-appearing elderly  female . She is awake alert oriented. She is very pleasant.   HEENT : Head is normocephalic atraumatic. scalp " is normal to inspection , the hair is well groomed and short cropped . No alopecia noted.  Ears: Normal externally , canals clear , TMs pearly gray.   Eyes: Conjunctiva and sclera clear , pupils equal and reactive , EOMI.   Oropharynx : not examined due to mask .   Neck: Normal to inspection , soft and supple , no palpable adenopathy thyromegaly or clavicular adenopathy .   chest: Moderate reproducible tenderness along the right anterior/ lateral chest wall just beneath the breast line , and to the mid axillary line as well as posteriorly near the rhomboid musculature . There is no crepitus , chest wall deformity , or pinpoint tenderness.  Pulmonary : Clear vesicular breath sounds in all fields throughout the posterior without wheezing rales or rhonchi .   Cardiovascular : Regular rate and rhythm, no notable murmurs rubs or gallops . Carotids without bruit , DP pulses are 2+, no edema , no clubbing noted . Abdomen : Flat , soft , no tenderness organomegaly or palpable masses throughout .   Musculoskeletal: Mild arthritic changes of the hands, no inflammatory changes, good strength and range of motion in the upper and lower extremities . Full range of motion of the right upper extremity without limitation.   Neurologic: Intact and nonfocal, cranial nerves 2-12 grossly intact , patellar reflexes 1+ bilaterally .   Integumentary : Well tanned , good turgor , Scattered nevi throughout the posterior thorax , none are notably suspicious, there is no bruising rashes or eruptions throughout. the nails are in good repair.   Assessment/Plan   Problem List Items Addressed This Visit             ICD-10-CM    Hypothyroidism, unspecified stable.  Continue on levothyroxine 50 mcg daily. E03.9     Other Visit Diagnoses         Codes    Varicose veins of right lower extremity, unspecified whether complicated    -  Primary needs workup.  Patient will be referred to a varicose vein specialist. I83.91    Relevant Orders    Referral to  Vascular Medicine    Hyperlipidemia, unspecified hyperlipidemia type    stable.  Continue on fluvastatin 20 mg daily. E78.5    Relevant Orders    ECG 12 Lead (Completed)    Postmenopausal    needs workup.  Patient to receive requisition for bone density testing. Z78.0    Relevant Orders    XR DEXA bone density    Well adult exam    normal exam. Z00.00    Tremor    chronic, intermittent.  Stable.   R25.1    Dysthymia    stable.  Continue on sertraline 100 mg daily. F34.1

## 2025-03-17 ENCOUNTER — APPOINTMENT (OUTPATIENT)
Dept: NEUROLOGY | Facility: CLINIC | Age: 80
End: 2025-03-17
Payer: MEDICARE

## 2025-03-26 ENCOUNTER — HOSPITAL ENCOUNTER (OUTPATIENT)
Dept: RADIOLOGY | Facility: HOSPITAL | Age: 80
Discharge: HOME | End: 2025-03-26
Payer: MEDICARE

## 2025-03-26 DIAGNOSIS — Z78.0 POSTMENOPAUSAL: ICD-10-CM

## 2025-03-26 PROCEDURE — 77080 DXA BONE DENSITY AXIAL: CPT | Performed by: RADIOLOGY

## 2025-03-26 PROCEDURE — 77080 DXA BONE DENSITY AXIAL: CPT

## 2025-07-15 ENCOUNTER — TELEPHONE (OUTPATIENT)
Dept: PRIMARY CARE | Facility: CLINIC | Age: 80
End: 2025-07-15
Payer: MEDICARE

## 2025-08-02 DIAGNOSIS — E03.9 HYPOTHYROIDISM, UNSPECIFIED TYPE: ICD-10-CM

## 2025-08-04 RX ORDER — LEVOTHYROXINE SODIUM 50 UG/1
50 TABLET ORAL DAILY
Qty: 90 TABLET | Refills: 3 | Status: SHIPPED | OUTPATIENT
Start: 2025-08-04